# Patient Record
Sex: MALE | Race: WHITE | NOT HISPANIC OR LATINO | URBAN - METROPOLITAN AREA
[De-identification: names, ages, dates, MRNs, and addresses within clinical notes are randomized per-mention and may not be internally consistent; named-entity substitution may affect disease eponyms.]

---

## 2019-06-05 ENCOUNTER — NEW PATIENT (OUTPATIENT)
Dept: URBAN - METROPOLITAN AREA CLINIC 76 | Facility: CLINIC | Age: 64
End: 2019-06-05

## 2019-06-05 DIAGNOSIS — H16.223: ICD-10-CM

## 2019-06-05 DIAGNOSIS — H43.391: ICD-10-CM

## 2019-06-05 DIAGNOSIS — H43.812: ICD-10-CM

## 2019-06-05 DIAGNOSIS — H31.002: ICD-10-CM

## 2019-06-05 DIAGNOSIS — H11.152: ICD-10-CM

## 2019-06-05 DIAGNOSIS — H18.51: ICD-10-CM

## 2019-06-05 PROCEDURE — 92134 CPTRZ OPH DX IMG PST SGM RTA: CPT

## 2019-06-05 PROCEDURE — 92225 OPHTHALMOSCOPY (INITIAL): CPT

## 2019-06-05 PROCEDURE — 92004 COMPRE OPH EXAM NEW PT 1/>: CPT

## 2019-06-05 PROCEDURE — 92250 FUNDUS PHOTOGRAPHY W/I&R: CPT

## 2019-06-05 ASSESSMENT — TONOMETRY
OD_IOP_MMHG: 16
OS_IOP_MMHG: 15

## 2019-06-05 ASSESSMENT — VISUAL ACUITY
OD_CC: 20/30-1
OS_CC: 20/25-1

## 2019-06-06 ENCOUNTER — FOLLOW UP (OUTPATIENT)
Dept: URBAN - METROPOLITAN AREA CLINIC 39 | Facility: CLINIC | Age: 64
End: 2019-06-06

## 2019-06-06 DIAGNOSIS — H43.391: ICD-10-CM

## 2019-06-06 DIAGNOSIS — H43.812: ICD-10-CM

## 2019-06-06 PROCEDURE — 92014 COMPRE OPH EXAM EST PT 1/>: CPT

## 2019-06-06 PROCEDURE — 92226 OPHTHALMOSCOPY (SUB): CPT

## 2019-06-06 ASSESSMENT — TONOMETRY
OD_IOP_MMHG: 15
OS_IOP_MMHG: 14

## 2019-06-06 ASSESSMENT — VISUAL ACUITY
OS_CC: 20/16
OD_CC: 20/20

## 2019-06-28 ENCOUNTER — FOLLOW UP (OUTPATIENT)
Dept: URBAN - METROPOLITAN AREA CLINIC 39 | Facility: CLINIC | Age: 64
End: 2019-06-28

## 2019-06-28 DIAGNOSIS — H43.812: ICD-10-CM

## 2019-06-28 DIAGNOSIS — H43.391: ICD-10-CM

## 2019-06-28 PROCEDURE — 92014 COMPRE OPH EXAM EST PT 1/>: CPT

## 2019-06-28 PROCEDURE — 92226 OPHTHALMOSCOPY (SUB): CPT

## 2019-06-28 ASSESSMENT — TONOMETRY
OD_IOP_MMHG: 15
OS_IOP_MMHG: 13

## 2019-06-28 ASSESSMENT — VISUAL ACUITY
OD_CC: 20/20
OS_CC: 20/20+1

## 2019-08-27 ENCOUNTER — FOLLOW UP (OUTPATIENT)
Dept: URBAN - METROPOLITAN AREA CLINIC 76 | Facility: CLINIC | Age: 64
End: 2019-08-27

## 2019-08-27 DIAGNOSIS — H43.12: ICD-10-CM

## 2019-08-27 DIAGNOSIS — H52.03: ICD-10-CM

## 2019-08-27 DIAGNOSIS — H16.223: ICD-10-CM

## 2019-08-27 DIAGNOSIS — H43.812: ICD-10-CM

## 2019-08-27 PROCEDURE — 92014 COMPRE OPH EXAM EST PT 1/>: CPT

## 2019-08-27 PROCEDURE — 92226 OPHTHALMOSCOPY (SUB): CPT

## 2019-08-27 PROCEDURE — 92015 DETERMINE REFRACTIVE STATE: CPT

## 2019-08-27 ASSESSMENT — VISUAL ACUITY
OS_CC: 20/20-1
OD_CC: 20/25-1

## 2019-08-27 ASSESSMENT — KERATOMETRY
OD_AXISANGLE_DEGREES: 71
OS_K1POWER_DIOPTERS: 41.75
OS_AXISANGLE2_DEGREES: 12
OS_K2POWER_DIOPTERS: 43
OD_AXISANGLE2_DEGREES: 161
OD_K2POWER_DIOPTERS: 43
OS_AXISANGLE_DEGREES: 102
OD_K1POWER_DIOPTERS: 41.25

## 2019-08-27 ASSESSMENT — TONOMETRY
OD_IOP_MMHG: 16
OS_IOP_MMHG: 16

## 2022-02-14 ENCOUNTER — WEB ENCOUNTER (OUTPATIENT)
Dept: URBAN - METROPOLITAN AREA CLINIC 113 | Facility: CLINIC | Age: 67
End: 2022-02-14

## 2022-02-14 ENCOUNTER — DASHBOARD ENCOUNTERS (OUTPATIENT)
Age: 67
End: 2022-02-14

## 2022-02-14 ENCOUNTER — OFFICE VISIT (OUTPATIENT)
Dept: URBAN - METROPOLITAN AREA CLINIC 113 | Facility: CLINIC | Age: 67
End: 2022-02-14
Payer: MEDICARE

## 2022-02-14 VITALS
HEIGHT: 69 IN | HEART RATE: 89 BPM | BODY MASS INDEX: 31.7 KG/M2 | WEIGHT: 214 LBS | TEMPERATURE: 98.6 F | SYSTOLIC BLOOD PRESSURE: 160 MMHG | DIASTOLIC BLOOD PRESSURE: 88 MMHG

## 2022-02-14 DIAGNOSIS — K64.8 INTERNAL HEMORRHOIDS: ICD-10-CM

## 2022-02-14 DIAGNOSIS — Z86.010 PERSONAL HISTORY OF COLONIC POLYPS: ICD-10-CM

## 2022-02-14 PROBLEM — 428283002 HISTORY OF POLYP OF COLON (SITUATION): Status: ACTIVE | Noted: 2022-02-14

## 2022-02-14 PROBLEM — 90458007 INTERNAL HEMORRHOIDS: Status: ACTIVE | Noted: 2022-02-14

## 2022-02-14 PROCEDURE — 99202 OFFICE O/P NEW SF 15 MIN: CPT | Performed by: INTERNAL MEDICINE

## 2022-02-14 NOTE — HPI-TODAY'S VISIT:
Very pleasant 66-year-old man presents with a history of a possible hemorrhoid.  He states he can feel a bulge or a tag in the anal area.  He does not have rectal bleeding or melena.  No significant rectal pain.  His symptoms sound as though there is a skin tag prolapsing in and out.  No constipation or diarrhea.  He had a colonoscopy about a year ago in [place] which was reportedly notable for 2 small polyps.  These were removed.  He was told to have a follow-up colonoscopy in 5 years.  He does not have dysphagia heartburn or regurgitation.  No abdominal pain.  No nausea or vomiting.  No history of liver disease.

## 2022-02-21 ENCOUNTER — TELEPHONE ENCOUNTER (OUTPATIENT)
Dept: URBAN - METROPOLITAN AREA CLINIC 113 | Facility: CLINIC | Age: 67
End: 2022-02-21

## 2024-03-14 NOTE — EXAM-PHYSICAL EXAM
Digital rectal exam notable for mild internal and external hemorrhoids. No mass. normal prostate. No skin tags. Quality 226: Preventive Care And Screening: Tobacco Use: Screening And Cessation Intervention: Patient screened for tobacco use and is an ex/non-smoker Detail Level: Detailed Quality 110: Preventive Care And Screening: Influenza Immunization: Influenza Immunization not Administered for Documented Reasons. Quality 431: Preventive Care And Screening: Unhealthy Alcohol Use - Screening: Patient not identified as an unhealthy alcohol user when screened for unhealthy alcohol use using a systematic screening method